# Patient Record
Sex: FEMALE | Race: WHITE | Employment: UNEMPLOYED | URBAN - METROPOLITAN AREA
[De-identification: names, ages, dates, MRNs, and addresses within clinical notes are randomized per-mention and may not be internally consistent; named-entity substitution may affect disease eponyms.]

---

## 2021-01-30 ENCOUNTER — IMMUNIZATION (OUTPATIENT)
Dept: PRIMARY CARE CLINIC | Age: 81
End: 2021-01-30
Payer: MEDICARE

## 2021-01-30 DIAGNOSIS — Z23 NEED FOR VACCINATION: Primary | ICD-10-CM

## 2021-01-30 PROCEDURE — 91300 COVID-19, PFIZER VACCINE 30MCG/0.3ML DOSE: CPT | Performed by: PHYSICIAN ASSISTANT

## 2021-01-30 PROCEDURE — 0001A COVID-19, PFIZER VACCINE 30MCG/0.3ML DOSE: CPT | Performed by: PHYSICIAN ASSISTANT

## 2021-02-22 ENCOUNTER — IMMUNIZATION (OUTPATIENT)
Dept: PRIMARY CARE CLINIC | Age: 81
End: 2021-02-22
Payer: MEDICARE

## 2021-02-22 PROCEDURE — 0002A COVID-19, PFIZER VACCINE 30MCG/0.3ML DOSE: CPT | Performed by: NURSE PRACTITIONER

## 2021-02-22 PROCEDURE — 91300 COVID-19, PFIZER VACCINE 30MCG/0.3ML DOSE: CPT | Performed by: NURSE PRACTITIONER

## 2022-12-07 ENCOUNTER — APPOINTMENT (OUTPATIENT)
Dept: GENERAL RADIOLOGY | Age: 82
End: 2022-12-07
Payer: MEDICARE

## 2022-12-07 ENCOUNTER — HOSPITAL ENCOUNTER (EMERGENCY)
Age: 82
Discharge: HOME | End: 2022-12-08
Attending: EMERGENCY MEDICINE
Payer: MEDICARE

## 2022-12-07 DIAGNOSIS — J96.91 RESPIRATORY FAILURE WITH HYPOXIA, UNSPECIFIED CHRONICITY (HCC): ICD-10-CM

## 2022-12-07 DIAGNOSIS — I35.0 AORTIC VALVE STENOSIS, ETIOLOGY OF CARDIAC VALVE DISEASE UNSPECIFIED: Primary | ICD-10-CM

## 2022-12-07 DIAGNOSIS — J18.9 PNEUMONIA DUE TO INFECTIOUS ORGANISM, UNSPECIFIED LATERALITY, UNSPECIFIED PART OF LUNG: ICD-10-CM

## 2022-12-07 LAB
ADENOVIRUS BY PCR: NOT DETECTED
ALBUMIN SERPL-MCNC: 4.4 G/DL (ref 3.5–5.2)
ALP BLD-CCNC: 86 U/L (ref 35–104)
ALT SERPL-CCNC: 9 U/L (ref 0–32)
ANION GAP SERPL CALCULATED.3IONS-SCNC: 20 MMOL/L (ref 7–16)
AST SERPL-CCNC: 14 U/L (ref 0–31)
B.E.: -13.9 MMOL/L (ref -3–3)
BACTERIA: ABNORMAL /HPF
BASOPHILS ABSOLUTE: 0.12 E9/L (ref 0–0.2)
BASOPHILS RELATIVE PERCENT: 0.7 % (ref 0–2)
BETA-HYDROXYBUTYRATE: 0.52 MMOL/L (ref 0.02–0.27)
BILIRUB SERPL-MCNC: 0.7 MG/DL (ref 0–1.2)
BILIRUBIN URINE: ABNORMAL
BLOOD, URINE: ABNORMAL
BORDETELLA PARAPERTUSSIS BY PCR: NOT DETECTED
BORDETELLA PERTUSSIS BY PCR: NOT DETECTED
BUN BLDV-MCNC: 24 MG/DL (ref 6–23)
CALCIUM SERPL-MCNC: 9.6 MG/DL (ref 8.6–10.2)
CHLAMYDOPHILIA PNEUMONIAE BY PCR: NOT DETECTED
CHLORIDE BLD-SCNC: 106 MMOL/L (ref 98–107)
CLARITY: CLEAR
CO2: 13 MMOL/L (ref 22–29)
COARSE CASTS, UA: ABNORMAL /LPF (ref 0–2)
COHB: 0.4 % (ref 0–1.5)
COLOR: YELLOW
COMMENT: ABNORMAL
CORONAVIRUS 229E BY PCR: NOT DETECTED
CORONAVIRUS HKU1 BY PCR: NOT DETECTED
CORONAVIRUS NL63 BY PCR: NOT DETECTED
CORONAVIRUS OC43 BY PCR: NOT DETECTED
CREAT SERPL-MCNC: 2.1 MG/DL (ref 0.5–1)
CRITICAL: ABNORMAL
DATE ANALYZED: ABNORMAL
DATE OF COLLECTION: ABNORMAL
EKG ATRIAL RATE: 87 BPM
EKG P AXIS: 6 DEGREES
EKG P-R INTERVAL: 192 MS
EKG Q-T INTERVAL: 360 MS
EKG QRS DURATION: 64 MS
EKG QTC CALCULATION (BAZETT): 430 MS
EKG R AXIS: 8 DEGREES
EKG T AXIS: -60 DEGREES
EKG VENTRICULAR RATE: 86 BPM
EOSINOPHILS ABSOLUTE: 0.04 E9/L (ref 0.05–0.5)
EOSINOPHILS RELATIVE PERCENT: 0.2 % (ref 0–6)
EPITHELIAL CELLS, UA: ABNORMAL /HPF
FIO2: 60 %
GFR SERPL CREATININE-BSD FRML MDRD: 23 ML/MIN/1.73
GLUCOSE BLD-MCNC: 343 MG/DL (ref 74–99)
GLUCOSE URINE: 250 MG/DL
HCO3: 12.2 MMOL/L (ref 22–26)
HCT VFR BLD CALC: 38.3 % (ref 34–48)
HEMOGLOBIN: 11.8 G/DL (ref 11.5–15.5)
HHB: 2 % (ref 0–5)
HUMAN METAPNEUMOVIRUS BY PCR: NOT DETECTED
HUMAN RHINOVIRUS/ENTEROVIRUS BY PCR: NOT DETECTED
IMMATURE GRANULOCYTES #: 0.11 E9/L
IMMATURE GRANULOCYTES %: 0.6 % (ref 0–5)
INFLUENZA A BY PCR: NOT DETECTED
INFLUENZA A BY PCR: NOT DETECTED
INFLUENZA B BY PCR: NOT DETECTED
INFLUENZA B BY PCR: NOT DETECTED
KETONES, URINE: ABNORMAL MG/DL
LAB: ABNORMAL
LACTIC ACID: 5.2 MMOL/L (ref 0.5–2.2)
LACTIC ACID: 6.7 MMOL/L (ref 0.5–2.2)
LEUKOCYTE ESTERASE, URINE: NEGATIVE
LYMPHOCYTES ABSOLUTE: 1.44 E9/L (ref 1.5–4)
LYMPHOCYTES RELATIVE PERCENT: 8.1 % (ref 20–42)
Lab: ABNORMAL
MCH RBC QN AUTO: 27.4 PG (ref 26–35)
MCHC RBC AUTO-ENTMCNC: 30.8 % (ref 32–34.5)
MCV RBC AUTO: 89.1 FL (ref 80–99.9)
METHB: 0.3 % (ref 0–1.5)
MODE: ABNORMAL
MONOCYTES ABSOLUTE: 1.12 E9/L (ref 0.1–0.95)
MONOCYTES RELATIVE PERCENT: 6.3 % (ref 2–12)
MYCOPLASMA PNEUMONIAE BY PCR: NOT DETECTED
NEUTROPHILS ABSOLUTE: 14.88 E9/L (ref 1.8–7.3)
NEUTROPHILS RELATIVE PERCENT: 84.1 % (ref 43–80)
NITRITE, URINE: NEGATIVE
O2 CONTENT: 16.7 ML/DL
O2 SATURATION: 98 % (ref 92–98.5)
O2HB: 97.3 % (ref 94–97)
OPERATOR ID: 5100
PARAINFLUENZA VIRUS 1 BY PCR: NOT DETECTED
PARAINFLUENZA VIRUS 2 BY PCR: NOT DETECTED
PARAINFLUENZA VIRUS 3 BY PCR: NOT DETECTED
PARAINFLUENZA VIRUS 4 BY PCR: NOT DETECTED
PATIENT TEMP: 37 C
PCO2: 29.3 MMHG (ref 35–45)
PDW BLD-RTO: 14.1 FL (ref 11.5–15)
PFO2: 2.01 MMHG/%
PH BLOOD GAS: 7.24 (ref 7.35–7.45)
PH UA: 5.5 (ref 5–9)
PLATELET # BLD: 233 E9/L (ref 130–450)
PMV BLD AUTO: 12.9 FL (ref 7–12)
PO2: 120.4 MMHG (ref 75–100)
POTASSIUM SERPL-SCNC: 4 MMOL/L (ref 3.5–5)
PRO-BNP: ABNORMAL PG/ML (ref 0–450)
PROCALCITONIN: 0.11 NG/ML (ref 0–0.08)
PROTEIN UA: >=300 MG/DL
RBC # BLD: 4.3 E12/L (ref 3.5–5.5)
RBC UA: ABNORMAL /HPF (ref 0–2)
RESPIRATORY SYNCYTIAL VIRUS BY PCR: NOT DETECTED
SARS-COV-2, NAAT: NOT DETECTED
SARS-COV-2, PCR: NOT DETECTED
SODIUM BLD-SCNC: 139 MMOL/L (ref 132–146)
SOURCE, BLOOD GAS: ABNORMAL
SPECIFIC GRAVITY UA: >=1.03 (ref 1–1.03)
THB: 12.1 G/DL (ref 11.5–16.5)
TIME ANALYZED: 859
TOTAL PROTEIN: 7.3 G/DL (ref 6.4–8.3)
TROPONIN, HIGH SENSITIVITY: 54 NG/L (ref 0–9)
UROBILINOGEN, URINE: 0.2 E.U./DL
WBC # BLD: 17.7 E9/L (ref 4.5–11.5)
WBC UA: ABNORMAL /HPF (ref 0–5)

## 2022-12-07 PROCEDURE — 84484 ASSAY OF TROPONIN QUANT: CPT

## 2022-12-07 PROCEDURE — 83880 ASSAY OF NATRIURETIC PEPTIDE: CPT

## 2022-12-07 PROCEDURE — 87040 BLOOD CULTURE FOR BACTERIA: CPT

## 2022-12-07 PROCEDURE — 6370000000 HC RX 637 (ALT 250 FOR IP)

## 2022-12-07 PROCEDURE — 87635 SARS-COV-2 COVID-19 AMP PRB: CPT

## 2022-12-07 PROCEDURE — 94660 CPAP INITIATION&MGMT: CPT

## 2022-12-07 PROCEDURE — 2580000003 HC RX 258

## 2022-12-07 PROCEDURE — 84145 PROCALCITONIN (PCT): CPT

## 2022-12-07 PROCEDURE — 82010 KETONE BODYS QUAN: CPT

## 2022-12-07 PROCEDURE — 94640 AIRWAY INHALATION TREATMENT: CPT

## 2022-12-07 PROCEDURE — 85025 COMPLETE CBC W/AUTO DIFF WBC: CPT

## 2022-12-07 PROCEDURE — 6360000002 HC RX W HCPCS

## 2022-12-07 PROCEDURE — 71045 X-RAY EXAM CHEST 1 VIEW: CPT

## 2022-12-07 PROCEDURE — 96374 THER/PROPH/DIAG INJ IV PUSH: CPT

## 2022-12-07 PROCEDURE — 96365 THER/PROPH/DIAG IV INF INIT: CPT

## 2022-12-07 PROCEDURE — 0202U NFCT DS 22 TRGT SARS-COV-2: CPT

## 2022-12-07 PROCEDURE — 93005 ELECTROCARDIOGRAM TRACING: CPT | Performed by: EMERGENCY MEDICINE

## 2022-12-07 PROCEDURE — 83605 ASSAY OF LACTIC ACID: CPT

## 2022-12-07 PROCEDURE — 80053 COMPREHEN METABOLIC PANEL: CPT

## 2022-12-07 PROCEDURE — 81001 URINALYSIS AUTO W/SCOPE: CPT

## 2022-12-07 PROCEDURE — 99285 EMERGENCY DEPT VISIT HI MDM: CPT

## 2022-12-07 PROCEDURE — 82805 BLOOD GASES W/O2 SATURATION: CPT

## 2022-12-07 PROCEDURE — 93010 ELECTROCARDIOGRAM REPORT: CPT | Performed by: INTERNAL MEDICINE

## 2022-12-07 PROCEDURE — 87502 INFLUENZA DNA AMP PROBE: CPT

## 2022-12-07 PROCEDURE — 2500000003 HC RX 250 WO HCPCS

## 2022-12-07 PROCEDURE — 36415 COLL VENOUS BLD VENIPUNCTURE: CPT

## 2022-12-07 PROCEDURE — 96375 TX/PRO/DX INJ NEW DRUG ADDON: CPT

## 2022-12-07 RX ORDER — IPRATROPIUM BROMIDE AND ALBUTEROL SULFATE 2.5; .5 MG/3ML; MG/3ML
3 SOLUTION RESPIRATORY (INHALATION) ONCE
Status: COMPLETED | OUTPATIENT
Start: 2022-12-07 | End: 2022-12-07

## 2022-12-07 RX ORDER — SODIUM CHLORIDE 9 MG/ML
INJECTION, SOLUTION INTRAVENOUS CONTINUOUS
Status: DISCONTINUED | OUTPATIENT
Start: 2022-12-07 | End: 2022-12-08 | Stop reason: HOSPADM

## 2022-12-07 RX ORDER — 0.9 % SODIUM CHLORIDE 0.9 %
500 INTRAVENOUS SOLUTION INTRAVENOUS ONCE
Status: COMPLETED | OUTPATIENT
Start: 2022-12-07 | End: 2022-12-07

## 2022-12-07 RX ADMIN — DOXYCYCLINE 100 MG: 100 INJECTION, POWDER, LYOPHILIZED, FOR SOLUTION INTRAVENOUS at 08:37

## 2022-12-07 RX ADMIN — SODIUM CHLORIDE 500 ML: 9 INJECTION, SOLUTION INTRAVENOUS at 09:08

## 2022-12-07 RX ADMIN — IPRATROPIUM BROMIDE AND ALBUTEROL SULFATE 3 AMPULE: 2.5; .5 SOLUTION RESPIRATORY (INHALATION) at 07:49

## 2022-12-07 RX ADMIN — CEFTRIAXONE 2000 MG: 2 INJECTION, POWDER, FOR SOLUTION INTRAMUSCULAR; INTRAVENOUS at 08:13

## 2022-12-07 RX ADMIN — SODIUM CHLORIDE: 9 INJECTION, SOLUTION INTRAVENOUS at 09:50

## 2022-12-07 ASSESSMENT — ENCOUNTER SYMPTOMS
ABDOMINAL PAIN: 0
COUGH: 1
TROUBLE SWALLOWING: 0
NAUSEA: 0
EYES NEGATIVE: 1
SORE THROAT: 0
EYE REDNESS: 0
DIARRHEA: 0
VOMITING: 0
SHORTNESS OF BREATH: 1

## 2022-12-07 ASSESSMENT — PAIN - FUNCTIONAL ASSESSMENT
PAIN_FUNCTIONAL_ASSESSMENT: NONE - DENIES PAIN
PAIN_FUNCTIONAL_ASSESSMENT: NONE - DENIES PAIN

## 2022-12-07 NOTE — ED PROVIDER NOTES
Dunia Childs is an 81year old female that presents to the emergency department for shortness of breath that started a few weeks ago. Complaint is constant, moderate in severity, and worsened on exertion. Patient reports that she was recently diagnosed with aortic valve stenosis and over the past several days her shortness of breath has significantly worsened and has now noticed some lower extremity edema. On arrival, patient was 89% on room air and was placed on 3 L NC O2 with improvement. She denies any fever, chills, chest pain, abdominal pain, nausea, vomiting, diarrhea. The history is provided by the patient. Review of Systems   Constitutional:  Negative for chills and fever. HENT: Negative. Negative for congestion, sore throat and trouble swallowing. Eyes: Negative. Negative for redness. Respiratory:  Positive for cough and shortness of breath. Cardiovascular:  Positive for leg swelling. Negative for chest pain. Gastrointestinal:  Negative for abdominal pain, diarrhea, nausea and vomiting. Genitourinary: Negative. Negative for dysuria and hematuria. Musculoskeletal:  Negative for neck pain and neck stiffness. Skin: Negative. Neurological:  Negative for dizziness, light-headedness and headaches. Psychiatric/Behavioral: Negative. Negative for agitation and behavioral problems. Physical Exam  Vitals and nursing note reviewed. Constitutional:       Appearance: Normal appearance. She is well-developed. HENT:      Head: Normocephalic and atraumatic. Eyes:      Pupils: Pupils are equal, round, and reactive to light. Cardiovascular:      Rate and Rhythm: Normal rate and regular rhythm. Pulmonary:      Effort: Pulmonary effort is normal.      Breath sounds: Wheezing and rhonchi present. No rales. Comments: Diminished breath sounds bilaterally with expiratory wheeze  Abdominal:      Palpations: Abdomen is soft. Tenderness:  There is no abdominal tenderness. There is no guarding or rebound. Musculoskeletal:      Cervical back: Normal range of motion. No rigidity. Right lower leg: Edema present. Left lower leg: Edema present. Comments: 2+ lower extremity edema bilaterally   Skin:     General: Skin is warm and dry. Capillary Refill: Capillary refill takes less than 2 seconds. Neurological:      General: No focal deficit present. Mental Status: She is alert and oriented to person, place, and time. Psychiatric:         Mood and Affect: Mood normal.         Behavior: Behavior normal.        Procedures     MDM  Number of Diagnoses or Management Options  Aortic valve stenosis, etiology of cardiac valve disease unspecified  Pneumonia due to infectious organism, unspecified laterality, unspecified part of lung  Respiratory failure with hypoxia, unspecified chronicity ([facility] [address]  Diagnosis management comments: Patient presented the emergency department for worsening shortness of breath over the past few days with a history of CHF and new diagnosis of aortic valve stenosis. On auscultation, patient had diminished breath sounds bilaterally with expiratory wheezes. She was 89% on room air when she arrived, patient was placed on BiPAP with improvement. Work-up shows evidence of bilateral pneumonia, leukocytosis of 17,000, and a BNP of 31,000. Patient was started on antibiotics for pneumonia coverage and was administered 500 cc IV fluid with 100 cc/h after that with the patient being dehydrated-lactic acid was over 6 on arrival.  On repeat lactic acid, numbers improved. The patient several hours later was able to come off BiPAP and is breathing comfortably with family at bedside. They wish to be transferred to the [facility] to have cardiology evaluate them with the history of aortic valve stenosis and today's comorbidities. Was concern for possible diabetic ketoacidosis, beta hydroxybutyrate is 0.52, will continue maintenance fluids. Discussed case with Dr. Missy Mejia ([facility] cardiology), accepts the patient for transfer. --------------------------------------------- PAST HISTORY ---------------------------------------------  Past Medical History:  has a past medical history of CAD (coronary artery disease) and Diabetes mellitus ([facility] [address] Past Surgical History:  has a past surgical history that includes Cardiac surgery; fracture surgery; Colonoscopy; and Endoscopy, colon, diagnostic. Social History:  reports that she has never smoked. She does not have any smokeless tobacco history on file. She reports that she does not drink alcohol and does not use drugs. Family History: family history is not on file. The patients home medications have been reviewed. Allergies: Patient has no known allergies.     -------------------------------------------------- RESULTS -------------------------------------------------    Lab  Results for orders placed or performed during the hospital encounter of 12/07/22   COVID-19, Rapid    Specimen: Nasopharyngeal Swab   Result Value Ref Range    SARS-CoV-2, NAAT Not Detected Not Detected   Rapid influenza A/B antigens    Specimen: Nares   Result Value Ref Range    Influenza A by PCR Not Detected Not Detected    Influenza B by PCR Not Detected Not Detected   CBC with Auto Differential   Result Value Ref Range    WBC 17.7 (H) 4.5 - 11.5 E9/L    RBC 4.30 3.50 - 5.50 E12/L    Hemoglobin 11.8 11.5 - 15.5 g/dL    Hematocrit 38.3 34.0 - 48.0 %    MCV 89.1 80.0 - 99.9 fL    MCH 27.4 26.0 - 35.0 pg    MCHC 30.8 (L) 32.0 - 34.5 %    RDW 14.1 11.5 - 15.0 fL    Platelets 184 688 - 803 E9/L    MPV 12.9 (H) 7.0 - 12.0 fL    Neutrophils % 84.1 (H) 43.0 - 80.0 %    Immature Granulocytes % 0.6 0.0 - 5.0 %    Lymphocytes % 8.1 (L) 20.0 - 42.0 %    Monocytes % 6.3 2.0 - 12.0 %    Eosinophils % 0.2 0.0 - 6.0 %    Basophils % 0.7 0.0 - 2.0 %    Neutrophils Absolute 14.88 (H) 1.80 - 7.30 E9/L    Immature Granulocytes # 0.11 E9/L Lymphocytes Absolute 1.44 (L) 1.50 - 4.00 E9/L    Monocytes Absolute 1.12 (H) 0.10 - 0.95 E9/L    Eosinophils Absolute 0.04 (L) 0.05 - 0.50 E9/L    Basophils Absolute 0.12 0.00 - 0.20 E9/L   Comprehensive Metabolic Panel   Result Value Ref Range    Sodium 139 132 - 146 mmol/L    Potassium 4.0 3.5 - 5.0 mmol/L    Chloride 106 98 - 107 mmol/L    CO2 13 (L) 22 - 29 mmol/L    Anion Gap 20 (H) 7 - 16 mmol/L    Glucose 343 (H) 74 - 99 mg/dL    BUN 24 (H) 6 - 23 mg/dL    Creatinine 2.1 (H) 0.5 - 1.0 mg/dL    Est, Glom Filt Rate 23 >=60 mL/min/1.73    Calcium 9.6 8.6 - 10.2 mg/dL    Total Protein 7.3 6.4 - 8.3 g/dL    Albumin 4.4 3.5 - 5.2 g/dL    Total Bilirubin 0.7 0.0 - 1.2 mg/dL    Alkaline Phosphatase 86 35 - 104 U/L    ALT 9 0 - 32 U/L    AST 14 0 - 31 U/L   Troponin   Result Value Ref Range    Troponin, High Sensitivity 54 (H) 0 - 9 ng/L   Brain Natriuretic Peptide   Result Value Ref Range    Pro-BNP 31,897 (H) 0 - 450 pg/mL   Lactic Acid   Result Value Ref Range    Lactic Acid 6.7 (HH) 0.5 - 2.2 mmol/L   Beta-Hydroxybutyrate   Result Value Ref Range    Beta-Hydroxybutyrate 0.52 (H) 0.02 - 0.27 mmol/L   Blood Gas, Arterial   Result Value Ref Range    Date Analyzed 20221207     Time Analyzed 0859     Source: Blood Arterial     pH, Blood Gas 7.236 (L) 7.350 - 7.450    PCO2 29.3 (L) 35.0 - 45.0 mmHg    PO2 120.4 (H) 75.0 - 100.0 mmHg    HCO3 12.2 (L) 22.0 - 26.0 mmol/L    B.E. -13.9 (L) -3.0 - 3.0 mmol/L    O2 Sat 98.0 92.0 - 98.5 %    PO2/FIO2 2.01 mmHg/%    O2Hb 97.3 (H) 94.0 - 97.0 %    COHb 0.4 0.0 - 1.5 %    MetHb 0.3 0.0 - 1.5 %    O2 Content 16.7 mL/dL    HHb 2.0 0.0 - 5.0 %    tHb (est) 12.1 11.5 - 16.5 g/dL    Mode NIV PAP     FIO2 60.0 %    Comment Bipap 10/5     Date Of Collection      Time Collected      Pt Temp 37.0 C     ID [id number]     Lab [id number]     Critical(s) Notified .  No Critical Values    Lactic Acid   Result Value Ref Range    Lactic Acid 5.2 (HH) 0.5 - 2.2 mmol/L   Procalcitonin   Result Value Ref Range    Procalcitonin 0.11 (H) 0.00 - 0.08 ng/mL   EKG 12 Lead   Result Value Ref Range    Ventricular Rate 86 BPM    Atrial Rate 87 BPM    P-R Interval 192 ms    QRS Duration 64 ms    Q-T Interval 360 ms    QTc Calculation (Bazett) 430 ms    P Axis 6 degrees    R Axis 8 degrees    T Axis -60 degrees       Radiology  XR CHEST PORTABLE    Result Date: 12/7/2022  EXAMINATION: ONE XRAY VIEW OF THE CHEST 12/7/2022 7:40 am COMPARISON: None. HISTORY: ORDERING SYSTEM PROVIDED HISTORY: Shortness of breath TECHNOLOGIST PROVIDED HISTORY: Reason for exam:->Shortness of breath What reading provider will be dictating this exam?->CRC FINDINGS: The cardiac silhouette is enlarged. Postop sternotomy changes are noted Extensive multifocal bilateral airspace disease is noted. There is a small left pleural effusion. 1. Extensive multifocal bilateral airspace disease 2. Small left pleural effusion           ------------------------- NURSING NOTES AND VITALS REVIEWED ---------------------------  Date / Time Roomed:  12/7/2022  6:40 AM  ED Bed Assignment:  22/22    The nursing notes within the ED encounter and vital signs as below have been reviewed.    Patient Vitals for the past 24 hrs:   BP Temp Temp src Pulse Resp SpO2 Height Weight   12/07/22 1307 (!) 120/57 -- -- 76 21 97 % -- --   12/07/22 1204 -- -- -- 77 23 96 % -- --   12/07/22 1148 (!) 113/46 -- -- 76 23 100 % -- --   12/07/22 0947 (!) 105/53 -- -- 87 28 99 % -- --   12/07/22 0736 115/88 -- -- 88 (!) 33 92 % -- --   12/07/22 0717 -- -- -- -- (!) 33 -- -- --   12/07/22 0704 (!) 132/91 -- -- 85 29 93 % -- --   12/07/22 0645 -- -- -- -- -- -- 5' 3\" (1.6 m) 175 lb (79.4 kg)   12/07/22 0637 133/84 97.2 °F (36.2 °C) Oral 86 24 92 % -- --       Oxygen Saturation Interpretation: Normal      ------------------------------------------ PROGRESS NOTES ------------------------------------------      I have spoken with the patient and discussed todays results, in addition to providing specific details for the plan of care and counseling regarding the diagnosis and prognosis. Their questions are answered at this time and they are agreeable with the plan.      --------------------------------- ADDITIONAL PROVIDER NOTES ---------------------------------  Consultations:  Spoke with Dr. Shadi Smith (Cardiology for [facility]),  They accept the patient for transfer. This patient's ED course included: a personal history and physicial examination, multiple bedside re-evaluations, IV medications, cardiac monitoring, and continuous pulse oximetry    This patient has remained unchanged during their ED course. Clinical Impression  1. Aortic valve stenosis, etiology of cardiac valve disease unspecified    2. Respiratory failure with hypoxia, unspecified chronicity ([address]    3. Pneumonia due to infectious organism, unspecified laterality, unspecified part of lung          Disposition  Patient's disposition: Transfer to [facility]  Patient's condition is serious. ED Course as of 12/07/22 1652   Wed Dec 07, 2022   9796 Breathing improved with BiPAP in the emergency department. Aware of the patient's lactic acidosis, will administer IV fluids and collect blood cultures. [DT]   1130 Reevaluation-patient reports her breathing is much improved and is resting comfortably, still on BiPAP. Still waiting for beta hydroxybutyrate to be resulted. Ordered urine and repeat lactic acid. Will admit the patient after results. Note-she states she has never been in diabetic ketoacidosis before or required any hospitalization for diabetes.  [DT]      ED Course User Index  [DT] Nargis Du,   Resident  12/07/22 [id number]

## 2022-12-07 NOTE — PROGRESS NOTES
Date: 12/7/2022    Time: 7:32 AM    Patient Placed On BIPAP/CPAP/ Non-Invasive Ventilation? Yes    If no must comment. Facial area red/color change? No           If YES are Blister/Lesion present? No   If yes must notify nursing staff  BIPAP/CPAP skin barrier?   Yes    Skin barrier type:mepilexlite       Comments:        Collins Sánchez RCP

## 2022-12-07 NOTE — PROGRESS NOTES
12/07/22 1204   Oxygen Therapy/Pulse Ox   O2 Device Nasal cannula   O2 Flow Rate (L/min) (S)  5 L/min   Heart Rate 77   Resp 23   SpO2 96 %   Pulse Oximeter Device Mode Continuous   Pulse Oximeter Device Location Finger       Per patient, her breathing is better at this time. Dr. Rei Perry said patient can try to come off the BIPAP. Patient placed on a NC 5 LPM. SPO2 96%.

## 2022-12-08 VITALS
HEART RATE: 74 BPM | BODY MASS INDEX: 31.01 KG/M2 | SYSTOLIC BLOOD PRESSURE: 144 MMHG | RESPIRATION RATE: 16 BRPM | OXYGEN SATURATION: 98 % | HEIGHT: 63 IN | TEMPERATURE: 97.2 F | WEIGHT: 175 LBS | DIASTOLIC BLOOD PRESSURE: 70 MMHG

## 2022-12-08 LAB
BLOOD CULTURE, ROUTINE: NORMAL
CULTURE, BLOOD 2: NORMAL

## 2022-12-08 PROCEDURE — 94660 CPAP INITIATION&MGMT: CPT

## 2022-12-08 NOTE — ED NOTES
Updated patient on transportation situation to [facility] clinic. At this time patient breathing seemed slightly labored. This RN placed the patient back on the cpap machine using previous settings.  Patient resting comfortably in bed     Yari Camejo RN  12/07/22 [id number]

## 2022-12-08 NOTE — CARE COORDINATION
Social Work/ Transition of Care:    Pt presents to the ED secondary to shortness of breath. Pt is from home. Pt's pulse ox was 89% on room air upon arrival to ED. Pt  was placed on 3L oxygen at that time. Per ED physician, pt will be transferred to [facility] for evaluation by cardiology. Transport arranged through access center and [facility] ambulance accepted the trip. SW spoke to pt,  Veronica Ortizo), and daughter Annia ), concerning possible private pay cost for ambulance transport to [facility]. Pt's  given private pay cost from [employer] member in ED. Pt's  verbalized understanding.

## 2022-12-08 NOTE — ED NOTES
Hawk Horton from access center call, pt would have to pay [facility] to take her to [facility]. Asked if they could call other ambulances that would be covered by her insurance.       Yazmin Bird  12/08/22 [id number]

## 2022-12-12 LAB
BLOOD CULTURE, ROUTINE: NORMAL
CULTURE, BLOOD 2: NORMAL

## 2023-03-15 ENCOUNTER — HOSPITAL ENCOUNTER (EMERGENCY)
Age: 83
Discharge: HOME | End: 2023-03-15
Attending: EMERGENCY MEDICINE | Admitting: FAMILY MEDICINE
Payer: MEDICARE

## 2023-03-15 ENCOUNTER — APPOINTMENT (OUTPATIENT)
Dept: GENERAL RADIOLOGY | Age: 83
End: 2023-03-15
Payer: MEDICARE

## 2023-03-15 VITALS
RESPIRATION RATE: 14 BRPM | BODY MASS INDEX: 25.69 KG/M2 | OXYGEN SATURATION: 96 % | DIASTOLIC BLOOD PRESSURE: 49 MMHG | HEART RATE: 61 BPM | TEMPERATURE: 97 F | SYSTOLIC BLOOD PRESSURE: 139 MMHG | WEIGHT: 145 LBS

## 2023-03-15 DIAGNOSIS — R00.1 BRADYCARDIA: Primary | ICD-10-CM

## 2023-03-15 DIAGNOSIS — E87.5 HYPERKALEMIA: ICD-10-CM

## 2023-03-15 LAB
ALBUMIN SERPL-MCNC: 3.5 G/DL (ref 3.5–5.2)
ALP SERPL-CCNC: 66 U/L (ref 35–104)
ALT SERPL-CCNC: 18 U/L (ref 0–32)
ANION GAP SERPL CALCULATED.3IONS-SCNC: 12 MMOL/L (ref 7–16)
AST SERPL-CCNC: 58 U/L (ref 0–31)
BACTERIA URNS QL MICRO: ABNORMAL /HPF
BASOPHILS # BLD: 0.04 E9/L (ref 0–0.2)
BASOPHILS NFR BLD: 0.7 % (ref 0–2)
BILIRUB SERPL-MCNC: 0.3 MG/DL (ref 0–1.2)
BILIRUB UR QL STRIP: NEGATIVE
BNP BLD-MCNC: 7171 PG/ML (ref 0–450)
BUN SERPL-MCNC: 27 MG/DL (ref 6–23)
CALCIUM SERPL-MCNC: 8.6 MG/DL (ref 8.6–10.2)
CHLORIDE SERPL-SCNC: 103 MMOL/L (ref 98–107)
CLARITY UR: CLEAR
CO2 SERPL-SCNC: 20 MMOL/L (ref 22–29)
COLOR UR: YELLOW
CREAT SERPL-MCNC: 3.5 MG/DL (ref 0.5–1)
EKG ATRIAL RATE: 156 BPM
EKG Q-T INTERVAL: 500 MS
EKG QRS DURATION: 92 MS
EKG QTC CALCULATION (BAZETT): 460 MS
EKG R AXIS: -39 DEGREES
EKG T AXIS: 43 DEGREES
EKG VENTRICULAR RATE: 51 BPM
EOSINOPHIL # BLD: 0.15 E9/L (ref 0.05–0.5)
EOSINOPHIL NFR BLD: 2.8 % (ref 0–6)
ERYTHROCYTE [DISTWIDTH] IN BLOOD BY AUTOMATED COUNT: 15.3 FL (ref 11.5–15)
GLUCOSE BLD-MCNC: 100 MG/DL (ref 74–99)
GLUCOSE SERPL-MCNC: 101 MG/DL (ref 74–99)
GLUCOSE UR STRIP-MCNC: NEGATIVE MG/DL
HCT VFR BLD AUTO: 32.3 % (ref 34–48)
HGB BLD-MCNC: 9.7 G/DL (ref 11.5–15.5)
HGB UR QL STRIP: ABNORMAL
IMM GRANULOCYTES # BLD: 0.01 E9/L
IMM GRANULOCYTES NFR BLD: 0.2 % (ref 0–5)
KETONES UR STRIP-MCNC: NEGATIVE MG/DL
LEUKOCYTE ESTERASE UR QL STRIP: ABNORMAL
LYMPHOCYTES # BLD: 1.3 E9/L (ref 1.5–4)
LYMPHOCYTES NFR BLD: 24.3 % (ref 20–42)
MAGNESIUM SERPL-MCNC: 2.4 MG/DL (ref 1.6–2.6)
MCH RBC QN AUTO: 29.9 PG (ref 26–35)
MCHC RBC AUTO-ENTMCNC: 30 % (ref 32–34.5)
MCV RBC AUTO: 99.7 FL (ref 80–99.9)
MONOCYTES # BLD: 0.5 E9/L (ref 0.1–0.95)
MONOCYTES NFR BLD: 9.3 % (ref 2–12)
NEUTROPHILS # BLD: 3.36 E9/L (ref 1.8–7.3)
NEUTS SEG NFR BLD: 62.7 % (ref 43–80)
NITRITE UR QL STRIP: NEGATIVE
PERFORMED ON: ABNORMAL
PH UR STRIP: 6 [PH] (ref 5–9)
PLATELET # BLD AUTO: 77 E9/L (ref 130–450)
PLATELET CONFIRMATION: NORMAL
PMV BLD AUTO: 11.8 FL (ref 7–12)
POC CHLORIDE: 108 MMOL/L (ref 100–108)
POC CREATININE: 3.5 MG/DL (ref 0.5–1)
POC SODIUM: 137 MMOL/L (ref 132–146)
POTASSIUM SERPL-SCNC: 5.2 MMOL/L (ref 3.5–5)
POTASSIUM SERPL-SCNC: 5.8 MMOL/L (ref 3.5–5)
PROT SERPL-MCNC: 6.1 G/DL (ref 6.4–8.3)
PROT UR STRIP-MCNC: NEGATIVE MG/DL
RBC # BLD AUTO: 3.24 E12/L (ref 3.5–5.5)
RBC #/AREA URNS HPF: ABNORMAL /HPF (ref 0–2)
SODIUM SERPL-SCNC: 135 MMOL/L (ref 132–146)
SP GR UR STRIP: 1.01 (ref 1–1.03)
T4 SERPL-MCNC: 6.6 MCG/DL (ref 4.5–11.7)
TROPONIN, HIGH SENSITIVITY: 113 NG/L (ref 0–9)
TSH SERPL-MCNC: 6.14 UIU/ML (ref 0.27–4.2)
UROBILINOGEN UR STRIP-ACNC: 0.2 E.U./DL
WBC # BLD: 5.4 E9/L (ref 4.5–11.5)
WBC #/AREA URNS HPF: ABNORMAL /HPF (ref 0–5)

## 2023-03-15 PROCEDURE — 82435 ASSAY OF BLOOD CHLORIDE: CPT

## 2023-03-15 PROCEDURE — 93005 ELECTROCARDIOGRAM TRACING: CPT | Performed by: EMERGENCY MEDICINE

## 2023-03-15 PROCEDURE — 96375 TX/PRO/DX INJ NEW DRUG ADDON: CPT

## 2023-03-15 PROCEDURE — 84295 ASSAY OF SERUM SODIUM: CPT

## 2023-03-15 PROCEDURE — 83880 ASSAY OF NATRIURETIC PEPTIDE: CPT

## 2023-03-15 PROCEDURE — 96365 THER/PROPH/DIAG IV INF INIT: CPT

## 2023-03-15 PROCEDURE — 2580000003 HC RX 258: Performed by: EMERGENCY MEDICINE

## 2023-03-15 PROCEDURE — 82565 ASSAY OF CREATININE: CPT

## 2023-03-15 PROCEDURE — 80053 COMPREHEN METABOLIC PANEL: CPT

## 2023-03-15 PROCEDURE — 96361 HYDRATE IV INFUSION ADD-ON: CPT

## 2023-03-15 PROCEDURE — 84436 ASSAY OF TOTAL THYROXINE: CPT

## 2023-03-15 PROCEDURE — 6360000002 HC RX W HCPCS: Performed by: EMERGENCY MEDICINE

## 2023-03-15 PROCEDURE — 1200000000 HC SEMI PRIVATE

## 2023-03-15 PROCEDURE — 83735 ASSAY OF MAGNESIUM: CPT

## 2023-03-15 PROCEDURE — 84484 ASSAY OF TROPONIN QUANT: CPT

## 2023-03-15 PROCEDURE — 87077 CULTURE AEROBIC IDENTIFY: CPT

## 2023-03-15 PROCEDURE — 84443 ASSAY THYROID STIM HORMONE: CPT

## 2023-03-15 PROCEDURE — 84132 ASSAY OF SERUM POTASSIUM: CPT

## 2023-03-15 PROCEDURE — 85025 COMPLETE CBC W/AUTO DIFF WBC: CPT

## 2023-03-15 PROCEDURE — 6370000000 HC RX 637 (ALT 250 FOR IP): Performed by: EMERGENCY MEDICINE

## 2023-03-15 PROCEDURE — 93010 ELECTROCARDIOGRAM REPORT: CPT | Performed by: INTERNAL MEDICINE

## 2023-03-15 PROCEDURE — 99285 EMERGENCY DEPT VISIT HI MDM: CPT

## 2023-03-15 PROCEDURE — 87186 SC STD MICRODIL/AGAR DIL: CPT

## 2023-03-15 PROCEDURE — 82947 ASSAY GLUCOSE BLOOD QUANT: CPT

## 2023-03-15 PROCEDURE — 87088 URINE BACTERIA CULTURE: CPT

## 2023-03-15 PROCEDURE — 36415 COLL VENOUS BLD VENIPUNCTURE: CPT

## 2023-03-15 PROCEDURE — 71045 X-RAY EXAM CHEST 1 VIEW: CPT

## 2023-03-15 PROCEDURE — 81001 URINALYSIS AUTO W/SCOPE: CPT

## 2023-03-15 PROCEDURE — 2500000003 HC RX 250 WO HCPCS: Performed by: EMERGENCY MEDICINE

## 2023-03-15 RX ORDER — CALCIUM GLUCONATE 10 MG/ML
1000 INJECTION, SOLUTION INTRAVENOUS ONCE
Status: COMPLETED | OUTPATIENT
Start: 2023-03-15 | End: 2023-03-15

## 2023-03-15 RX ORDER — DEXTROSE MONOHYDRATE 25 G/50ML
25 INJECTION, SOLUTION INTRAVENOUS ONCE
Status: COMPLETED | OUTPATIENT
Start: 2023-03-15 | End: 2023-03-15

## 2023-03-15 RX ORDER — 0.9 % SODIUM CHLORIDE 0.9 %
500 INTRAVENOUS SOLUTION INTRAVENOUS ONCE
Status: COMPLETED | OUTPATIENT
Start: 2023-03-15 | End: 2023-03-15

## 2023-03-15 RX ADMIN — CALCIUM GLUCONATE 1000 MG: 10 INJECTION, SOLUTION INTRAVENOUS at 16:44

## 2023-03-15 RX ADMIN — SODIUM CHLORIDE 500 ML: 9 INJECTION, SOLUTION INTRAVENOUS at 15:42

## 2023-03-15 RX ADMIN — INSULIN HUMAN 5 UNITS: 100 INJECTION, SOLUTION PARENTERAL at 15:42

## 2023-03-15 RX ADMIN — DEXTROSE MONOHYDRATE 25 G: 25 INJECTION, SOLUTION INTRAVENOUS at 15:42

## 2023-03-15 ASSESSMENT — PAIN - FUNCTIONAL ASSESSMENT: PAIN_FUNCTIONAL_ASSESSMENT: NONE - DENIES PAIN

## 2023-03-15 NOTE — ED PROVIDER NOTES
[address] [facility] ENCOUNTER        Pt Name: Peter Flores  MRN: [id number]  [place] 1940  Date of evaluation: 3/15/2023  Provider: Capo Rodriguez DO  PCP: Roc Chamorro MD  Note Started: 12:58 PM EDT 3/15/23    CHIEF COMPLAINT       Chief Complaint   Patient presents with    Bradycardia     30-50 at dialysis today, pt has no complaints        HISTORY OF PRESENT ILLNESS: 1 or more Elements   History From: Patient and EMS    Limitations to history : None    Peter Flores is a 81 y.o. female who presents with concern for bradycardia. She was at dialysis today and her heart rate got into the 30s, they sent her here for further evaluation. She was not having lightheadedness or dizziness, no chest pain or shortness of breath, no pain or swelling in her legs. Her last full dialysis was on Monday. She says she is supposed to get it 3 times a week. Has not been having fevers or chills, she completed her antibiotics from the [facility] clinic. No other acute complaints. Nursing Notes were all reviewed and agreed with or any disagreements were addressed in the HPI. REVIEW OF SYSTEMS :      Positives and Pertinent negatives as per HPI.      SURGICAL HISTORY     Past Surgical History:   Procedure Laterality Date    CARDIAC SURGERY      aortic valve replacement 2015- f/u w/ Dr. Idalmis Harvey and PCP    COLONOSCOPY      ENDOSCOPY, COLON, DIAGNOSTIC      FRACTURE SURGERY      screws placed, r ankle        CURRENTMEDICATIONS       Discharge Medication List as of 3/15/2023  5:40 PM        CONTINUE these medications which have NOT CHANGED    Details   linagliptin (TRADJENTA) 5 MG tablet Take 5 mg by mouth daily      bisoprolol-hydrochlorothiazide (ZIAC) 10-6.25 MG per tablet Take 1 tablet by mouth daily      ramipril (ALTACE) 10 MG capsule Take 10 mg by mouth 2 times daily Instructed to take am of procedure      amLODIPine (NORVASC) 10 MG tablet Take 10 mg by mouth daily Instructed to take am of procedure             ALLERGIES     Patient has no known allergies. FAMILYHISTORY     History reviewed. No pertinent family history. SOCIAL HISTORY       Social History     Tobacco Use    Smoking status: Never   Substance Use Topics    Alcohol use: No    Drug use: No       SCREENINGS        Jose Coma Scale  Eye Opening: Spontaneous  Best Verbal Response: Oriented  Best Motor Response: Obeys commands  [place] Coma Scale Score: 15                CIWA Assessment  BP: (!) 139/49  Heart Rate: 61           PHYSICAL EXAM  1 or more Elements     ED Triage Vitals   BP Temp Temp src Pulse Resp SpO2 Height Weight   -- -- -- -- -- -- -- --       Constitutional/General: Alert and oriented x3  Head: Normocephalic and atraumatic  Eyes: PERRL, EOMI, conjunctiva normal, sclera non icteric  ENT:  Oropharynx clear, handling secretions  Neck: Supple, full ROM, no stridor  Respiratory: Lungs clear to auscultation bilaterally, no wheezes, rales, or rhonchi. Not in respiratory distress  Cardiovascular: Bradycardic rate. Regular rhythm. 2+ distal pulses. Equal extremity pulses. Chest: No chest wall tenderness  GI:  Abdomen Soft, Non tender, Non distended. No rebound, guarding, or rigidity. Musculoskeletal: Moves all extremities x 4. Warm and well perfused, no cyanosis, no edema. Capillary refill <3 seconds  Integument: skin warm and dry. No rashes.   Healing incision right distal femur  Neurologic: GCS 15, no focal deficits, symmetric strength 5/5 in the upper and lower extremities bilaterally  Psychiatric: Normal Affect      DIAGNOSTIC RESULTS   LABS:    Labs Reviewed   CBC WITH AUTO DIFFERENTIAL - Abnormal; Notable for the following components:       Result Value    RBC 3.24 (*)     Hemoglobin 9.7 (*)     Hematocrit 32.3 (*)     MCHC 30.0 (*)     RDW 15.3 (*)     Platelets 77 (*)     Lymphocytes Absolute 1.30 (*)     All other components within normal limits COMPREHENSIVE METABOLIC PANEL - Abnormal; Notable for the following components:    Potassium 5.8 (*)     CO2 20 (*)     Glucose 101 (*)     BUN 27 (*)     Creatinine 3.5 (*)     Total Protein 6.1 (*)     AST 58 (*)     All other components within normal limits   TSH - Abnormal; Notable for the following components:    TSH 6.140 (*)     All other components within normal limits   URINALYSIS WITH MICROSCOPIC - Abnormal; Notable for the following components:    Leukocyte Esterase, Urine SMALL (*)     Bacteria, UA FEW (*)     All other components within normal limits   BRAIN NATRIURETIC PEPTIDE - Abnormal; Notable for the following components:    Pro-BNP 7,171 (*)     All other components within normal limits    Narrative:     CALL  Reed  H34 tel. ,  Chemistry results called to and read back by: , 03/15/2023 14:53, by  ASIA   TROPONIN - Abnormal; Notable for the following components:    Troponin, High Sensitivity 113 (*)     All other components within normal limits   POCT VENOUS - Abnormal; Notable for the following components:    POC Potassium 5.2 (*)     POC Glucose 100 (*)     POC Creatinine 3.5 (*)     All other components within normal limits   CULTURE, URINE   MAGNESIUM   PLATELET CONFIRMATION    Narrative:     CALL  Reed  H34 tel. ,  Chemistry results called to and read back by: , 03/15/2023 14:53, by  ASIA   T4   BASIC METABOLIC PANEL       As interpreted by me, the above displayed labs are abnormal. All other labs obtained during this visit were within normal range or not returned as of this dictation.     RADIOLOGY:   Non-plain film images such as CT, Ultrasound and MRI are read by the radiologist. Plain radiographic images are visualized and preliminarily interpreted by the ED Provider with the below findings:    Interpretation per the Radiologist below, if available at the time of this note:    XR CHEST PORTABLE   Final Result   New left-sided central venous catheter terminating in the right atrium   proper. Consider repositioning this more superiorly roughly 4 cm. No   pneumothorax. Resolving airspace disease. No results found. No results found. PROCEDURES   Unless otherwise noted below, none    PAST MEDICAL HISTORY/Chronic Conditions Affecting Care      has a past medical history of CAD (coronary artery disease) and Diabetes mellitus ([facility] [address] EMERGENCY DEPARTMENT COURSE    Vitals:    Vitals:    03/15/23 1303 03/15/23 1808   BP: (!) 152/45 (!) 139/49   Pulse: 51 61   Resp: 18 14   Temp: 97 °F (36.1 °C)    SpO2: 96% 96%   Weight: 145 lb (65.8 kg)        Patient was given the following medications:  Medications   0.9 % sodium chloride bolus (0 mLs IntraVENous Stopped 3/15/23 1731)   insulin regular (HUMULIN R;NOVOLIN R) injection 5 Units (5 Units IntraVENous Given 3/15/23 1542)   dextrose 50 % IV solution (25 g IntraVENous Given 3/15/23 1542)   calcium gluconate 1000 mg in sodium chloride 100 mL (0 mg IntraVENous Stopped 3/15/23 1731)       Medical Decision Making/Differential Diagnosis:  CC/HPI Summary, Social Determinants of health, Records Reviewed, DDx, testing done/not done, ED Course, Reassessment, disposition considerations/shared decision making with patient, consults, disposition:        CC/HPI Summary, DDx, ED Course, Reassessment, Tests Considered, Patient expectation:   81-year-old female with past medical history of hypertension, hyperlipidemia, diabetes, CKD, atrial fibrillation, age, mitral, tricuspid valve replacement on Eliquis presenting today with bradycardia. He was in the 30s at dialysis and she was sent here for further evaluation. She is asymptomatic. Blood pressure is entirely stable on arrival to emergency department. Nothing made it better or worse, she says that she did not take any extra of her metoprolol prior to arrival.  Has not been having fevers or chills.   Physical exam demonstrating alert and appropriately oriented female in no acute distress and nontoxic-appearing. EKG and telemetry demonstrating sinus bradycardia. Differential diagnosis to include but not limited to electrolyte abnormality, ACS, sepsis. EKG with no acute ST elevations, Valeriano lead obtained and multiple telemetry strips when rhythm changed and patient was always in sinus bradycardia. Labs demonstrate elevated BNP, hyperkalemia at 5.8 that was treated with IV calcium gluconate, D50 and insulin. No leukocytosis or anemia. Kidney function chronically elevated. Chest x-ray without acute cardiopulmonary pathology. Plan had been for admission for bradycardia and further cardiac evaluation with dialysis planned for hyperkalemia potentially causing this bradycardia. Patient's family doctor came to evaluate patient at bedside and will coordinate outpatient management. With shared decision-making, the family and the patient and her PCP are agreeable to this plan. Given very strict return precautions. ED Course as of 03/15/23 2139   Wed Mar 15, 2023   1257 Last echo completed on 2/28/2023 at the [facility] clinic that demonstrated LVEF at 63± percent. [MM]   9427 On chart review, she was recently admitted to the [facility] clinic for redo of aortic valve replacement and mitral valve replacement and tricuspid valve replacement. 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 She developed kidney injury while in the hospital and began to require dialysis and a tunneled left chest hemodialysis line was placed. She has been discharged to rehab facility although had to be readmitted secondary to lower extremity saphenous vein graft harvesting wound infection that required further debridement. She is done with antibiotics and still getting antibiotics intermittently. [MM]   2975 1619 EKG: This EKG is signed and interpreted by me.     Rate: 51  Rhythm: Sinus bradycardia  Interpretation: no acute changes, no acute ST elevations, left axis deviation  Comparison: stable as compared to patient's most recent EKG on 12/7/2022   [MM]   1328 On chart review from [facility], she is on Eliquis, aspirin, amiodarone, metoprolol, Bumex. The readmission was on 1/26. LVEF 38%. History of hypertension, hyperlipidemia, diabetes, chronic kidney disease, atrial fibrillation. Atrial, mitral, tricuspid valve replacement. [MM]   1340 She was last seen by her cardiologist in 2/28/2023 [MM]   1441 1440 EKG: This EKG is signed and interpreted by me. Rate: 53  Rhythm: Sinus  Interpretation: no acute changes  Comparison: stable as compared to patient's most recent EKG earlier today   [MM]   1442 Patient did have episodes worsening bradycardia into the 30s, reevaluated, blood pressure still stable, she is asymptomatic with no lightheadedness or dizziness or shortness of breath. [MM]   1507 Acute on chronic kidney injury [MM]   1518 Potassium(!): 5.8 [SO]   1522 Able to obtain rhythm strip for heart rate in the 30s and is still demonstrating to be sinus bradycardia. [MM]   7803 Discussed with Dr. Mirta Burkitt and he will accept for admission and management. [MM]   2480 Patient's PCP is arranging for outpatient management. Family is agreeable. [MM]      ED Course User Index  [MM] Gabi Mccullough DO  [SO] Desmond Aleman DO      CONSULTS: (Who and What was discussed)  IP CONSULT TO NEPHROLOGY  IP CONSULT TO INTERNAL MEDICINE  IP CONSULT TO CARDIOLOGY    FINAL IMPRESSION      1. Bradycardia    2.  Hyperkalemia          DISPOSITION/PLAN     DISPOSITION Decision To Discharge 03/15/2023 05:12:41 PM      PATIENT REFERRED TO:  MD Josiah Gonzalez Ezio Mayoa     Call   follow up    DISCHARGE MEDICATIONS:  Discharge Medication List as of 3/15/2023  5:40 PM               (Please note that portions of this note were completed with a voice recognition program.  Efforts were made to edit the dictations but occasionally words are mis-transcribed. )    Terrence Galvin DO (electronically signed)            Terrence Galvin DO  Resident  03/15/23 0028

## 2023-03-15 NOTE — ED NOTES
Pt stating she is not having chest pain or SOB. HR 34-51. EKG completed. Dr Markie Howell notified.  Pt ANO x 4     Juan Alberto Nicole RN  03/15/23 7445

## 2023-03-17 LAB
EKG ATRIAL RATE: 53 BPM
EKG Q-T INTERVAL: 500 MS
EKG QRS DURATION: 82 MS
EKG QTC CALCULATION (BAZETT): 469 MS
EKG R AXIS: 151 DEGREES
EKG T AXIS: 90 DEGREES
EKG VENTRICULAR RATE: 53 BPM

## 2023-03-18 LAB
BACTERIA UR CULT: ABNORMAL
BACTERIA UR CULT: ABNORMAL
ORGANISM: ABNORMAL

## 2023-05-26 LAB — POTASSIUM SERPL-SCNC: 3.9 MMOL/L (ref 3.5–5)

## 2024-03-20 RX ORDER — ONDANSETRON 2 MG/ML
4 INJECTION INTRAMUSCULAR; INTRAVENOUS EVERY 6 HOURS PRN
Status: CANCELLED | OUTPATIENT
Start: 2024-03-20

## 2024-03-20 RX ORDER — ATORVASTATIN CALCIUM 10 MG/1
10 TABLET, FILM COATED ORAL DAILY
COMMUNITY

## 2024-03-20 RX ORDER — ALLOPURINOL 100 MG/1
100 TABLET ORAL DAILY
COMMUNITY

## 2024-03-20 RX ORDER — AMIODARONE HYDROCHLORIDE 100 MG/1
100 TABLET ORAL EVERY MORNING
COMMUNITY

## 2024-03-20 RX ORDER — B,C/FOLIC/ZINC/SELENOMETH/D3/E 0.8-12.5MG
2 TABLET ORAL DAILY
COMMUNITY
End: 2024-03-20

## 2024-03-20 RX ORDER — ASPIRIN 81 MG/1
81 TABLET ORAL DAILY
COMMUNITY

## 2024-03-20 RX ORDER — B,C/FOLIC/ZINC/SELENOMETH/D3/E 0.8-12.5MG
1 TABLET ORAL 2 TIMES DAILY WITH MEALS
COMMUNITY

## 2024-03-20 RX ORDER — BRIMONIDINE TARTRATE 2 MG/ML
1 SOLUTION/ DROPS OPHTHALMIC 3 TIMES DAILY
Status: CANCELLED | OUTPATIENT
Start: 2024-03-20

## 2024-03-20 RX ORDER — ONDANSETRON 4 MG/1
4 TABLET, ORALLY DISINTEGRATING ORAL EVERY 8 HOURS PRN
Status: CANCELLED | OUTPATIENT
Start: 2024-03-20

## 2024-03-20 RX ORDER — VITAMIN B COMPLEX
1 CAPSULE ORAL DAILY
COMMUNITY

## 2024-03-20 RX ORDER — BUMETANIDE 1 MG/1
TABLET ORAL DAILY
COMMUNITY

## 2024-03-20 NOTE — PROGRESS NOTES
[facility] PRE-ADMISSION TESTING INSTRUCTIONS    The Preadmission Testing patient is instructed accordingly using the following criteria (check applicable):    ARRIVAL INSTRUCTIONS:  [x] Parking the day of Surgery is located in the Main Entrance lot.  Upon entering the door, make an immediate right to the surgery reception desk    [x] Bring photo ID and insurance card    [x] Bring in a copy of Living will or Durable Power of  papers.    [x] Please be sure to arrange for a responsible adult to provide transportation to and from the hospital    [x] Please arrange for a responsible adult to be with you for the 24 hour period post procedure due to having anesthesia    [x] If you awake am of surgery not feeling well or have temperature >100 please call [phone]    GENERAL INSTRUCTIONS:    [x] No solid foods after midnight, may have up to 8oz of water until 4 hours prior to surgery. No gum, no candy, no mints. NPO time: 0615       [x] You may brush your teeth, do not swallow any toothpaste    [x] Take medications as instructed     [x] Stop herbal supplements and vitamins prior to procedure    [] Follow preop dosing of blood thinners per physician instructions    [] Take 1/2 dose of evening insulin, but no insulin after midnight    [x] No oral diabetic medications after midnight    [x] If diabetic and have low blood sugar or feel symptomatic, take 1-2oz apple juice only    [] Bring inhalers day of surgery    [] Bring urine specimen day of surgery    [x] Shower or bath with soap, lather and rinse well, AM of Surgery, no lotion, powders or creams to surgical site    [] Follow bowel prep as instructed per surgeon    [x] No tobacco products within 24 hours of surgery     [x] No alcohol or illegal drug use, marijuana included, within 24 hours of surgery.    [x] Jewelry, body piercing's, eyeglasses, contact lenses and dentures are not permitted into surgery (bring cases)      [x] Please do

## 2024-03-20 NOTE — PROGRESS NOTES
Left voice message for Reena at Dr Donaldson's office requesting the medical clearance be faxed to PAT office.    Also, spoke to Chayo at Dr Black office - Chayo states she has the medical clearance and will fax the document to PAT office.

## 2024-03-21 ENCOUNTER — ANESTHESIA EVENT (OUTPATIENT)
Dept: OPERATING ROOM | Age: 84
End: 2024-03-21
Payer: MEDICARE

## 2024-03-21 ENCOUNTER — ANESTHESIA (OUTPATIENT)
Dept: OPERATING ROOM | Age: 84
End: 2024-03-21
Payer: MEDICARE

## 2024-03-21 ENCOUNTER — HOSPITAL ENCOUNTER (OUTPATIENT)
Age: 84
Setting detail: OUTPATIENT SURGERY
Discharge: HOME | End: 2024-03-21
Attending: STUDENT IN AN ORGANIZED HEALTH CARE EDUCATION/TRAINING PROGRAM | Admitting: STUDENT IN AN ORGANIZED HEALTH CARE EDUCATION/TRAINING PROGRAM
Payer: MEDICARE

## 2024-03-21 VITALS
BODY MASS INDEX: 27.46 KG/M2 | TEMPERATURE: 98 F | DIASTOLIC BLOOD PRESSURE: 55 MMHG | WEIGHT: 155 LBS | SYSTOLIC BLOOD PRESSURE: 123 MMHG | RESPIRATION RATE: 18 BRPM | OXYGEN SATURATION: 96 % | HEART RATE: 63 BPM | HEIGHT: 63 IN

## 2024-03-21 LAB
GLUCOSE BLD-MCNC: 128 MG/DL (ref 74–99)
POTASSIUM SERPL-SCNC: 4.3 MMOL/L (ref 3.5–5)

## 2024-03-21 PROCEDURE — 7100000011 HC PHASE II RECOVERY - ADDTL 15 MIN: Performed by: STUDENT IN AN ORGANIZED HEALTH CARE EDUCATION/TRAINING PROGRAM

## 2024-03-21 PROCEDURE — 3700000000 HC ANESTHESIA ATTENDED CARE: Performed by: STUDENT IN AN ORGANIZED HEALTH CARE EDUCATION/TRAINING PROGRAM

## 2024-03-21 PROCEDURE — 2500000003 HC RX 250 WO HCPCS: Performed by: STUDENT IN AN ORGANIZED HEALTH CARE EDUCATION/TRAINING PROGRAM

## 2024-03-21 PROCEDURE — 6360000002 HC RX W HCPCS: Performed by: NURSE ANESTHETIST, CERTIFIED REGISTERED

## 2024-03-21 PROCEDURE — 3600000012 HC SURGERY LEVEL 2 ADDTL 15MIN: Performed by: STUDENT IN AN ORGANIZED HEALTH CARE EDUCATION/TRAINING PROGRAM

## 2024-03-21 PROCEDURE — 6370000000 HC RX 637 (ALT 250 FOR IP): Performed by: STUDENT IN AN ORGANIZED HEALTH CARE EDUCATION/TRAINING PROGRAM

## 2024-03-21 PROCEDURE — 84132 ASSAY OF SERUM POTASSIUM: CPT

## 2024-03-21 PROCEDURE — 82962 GLUCOSE BLOOD TEST: CPT

## 2024-03-21 PROCEDURE — 3700000001 HC ADD 15 MINUTES (ANESTHESIA): Performed by: STUDENT IN AN ORGANIZED HEALTH CARE EDUCATION/TRAINING PROGRAM

## 2024-03-21 PROCEDURE — V2632 POST CHMBR INTRAOCULAR LENS: HCPCS | Performed by: STUDENT IN AN ORGANIZED HEALTH CARE EDUCATION/TRAINING PROGRAM

## 2024-03-21 PROCEDURE — 2580000003 HC RX 258: Performed by: NURSE ANESTHETIST, CERTIFIED REGISTERED

## 2024-03-21 PROCEDURE — 3600000002 HC SURGERY LEVEL 2 BASE: Performed by: STUDENT IN AN ORGANIZED HEALTH CARE EDUCATION/TRAINING PROGRAM

## 2024-03-21 PROCEDURE — 6360000002 HC RX W HCPCS: Performed by: STUDENT IN AN ORGANIZED HEALTH CARE EDUCATION/TRAINING PROGRAM

## 2024-03-21 PROCEDURE — 7100000010 HC PHASE II RECOVERY - FIRST 15 MIN: Performed by: STUDENT IN AN ORGANIZED HEALTH CARE EDUCATION/TRAINING PROGRAM

## 2024-03-21 PROCEDURE — 2709999900 HC NON-CHARGEABLE SUPPLY: Performed by: STUDENT IN AN ORGANIZED HEALTH CARE EDUCATION/TRAINING PROGRAM

## 2024-03-21 PROCEDURE — 6370000000 HC RX 637 (ALT 250 FOR IP)

## 2024-03-21 DEVICE — STERILE UV AND BLUE LIGHT FILTERING ACRYLIC FOLDABLE ASPHERIC POSTERIOR CHAMBER INTRAOCULAR LENS
Type: IMPLANTABLE DEVICE | Site: EYE | Status: FUNCTIONAL
Brand: CLAREON

## 2024-03-21 RX ORDER — MIDAZOLAM HYDROCHLORIDE 1 MG/ML
INJECTION INTRAMUSCULAR; INTRAVENOUS PRN
Status: DISCONTINUED | OUTPATIENT
Start: 2024-03-21 | End: 2024-03-21 | Stop reason: SDUPTHER

## 2024-03-21 RX ORDER — FENTANYL CITRATE 50 UG/ML
INJECTION, SOLUTION INTRAMUSCULAR; INTRAVENOUS PRN
Status: DISCONTINUED | OUTPATIENT
Start: 2024-03-21 | End: 2024-03-21 | Stop reason: SDUPTHER

## 2024-03-21 RX ORDER — KETOROLAC TROMETHAMINE 5 MG/ML
1 SOLUTION OPHTHALMIC
Status: COMPLETED | OUTPATIENT
Start: 2024-03-21 | End: 2024-03-21

## 2024-03-21 RX ORDER — TROPICAMIDE 10 MG/ML
1 SOLUTION/ DROPS OPHTHALMIC
Status: COMPLETED | OUTPATIENT
Start: 2024-03-21 | End: 2024-03-21

## 2024-03-21 RX ORDER — PHENYLEPHRINE HYDROCHLORIDE 25 MG/ML
1 SOLUTION/ DROPS OPHTHALMIC
Status: COMPLETED | OUTPATIENT
Start: 2024-03-21 | End: 2024-03-21

## 2024-03-21 RX ORDER — CYCLOPENTOLATE HYDROCHLORIDE 10 MG/ML
1 SOLUTION/ DROPS OPHTHALMIC
Status: COMPLETED | OUTPATIENT
Start: 2024-03-21 | End: 2024-03-21

## 2024-03-21 RX ORDER — CIPROFLOXACIN HYDROCHLORIDE 3.5 MG/ML
1 SOLUTION/ DROPS TOPICAL SEE ADMIN INSTRUCTIONS
Status: COMPLETED | OUTPATIENT
Start: 2024-03-21 | End: 2024-03-21

## 2024-03-21 RX ORDER — PREDNISOLONE ACETATE 10 MG/ML
SUSPENSION/ DROPS OPHTHALMIC PRN
Status: DISCONTINUED | OUTPATIENT
Start: 2024-03-21 | End: 2024-03-21 | Stop reason: HOSPADM

## 2024-03-21 RX ORDER — SODIUM CHLORIDE 9 MG/ML
INJECTION, SOLUTION INTRAVENOUS CONTINUOUS PRN
Status: DISCONTINUED | OUTPATIENT
Start: 2024-03-21 | End: 2024-03-21 | Stop reason: SDUPTHER

## 2024-03-21 RX ORDER — CIPROFLOXACIN HYDROCHLORIDE 3.5 MG/ML
SOLUTION/ DROPS TOPICAL PRN
Status: DISCONTINUED | OUTPATIENT
Start: 2024-03-21 | End: 2024-03-21 | Stop reason: HOSPADM

## 2024-03-21 RX ORDER — PROPARACAINE HYDROCHLORIDE 5 MG/ML
1 SOLUTION/ DROPS OPHTHALMIC SEE ADMIN INSTRUCTIONS
Status: DISCONTINUED | OUTPATIENT
Start: 2024-03-21 | End: 2024-03-21 | Stop reason: HOSPADM

## 2024-03-21 RX ORDER — SODIUM CHLORIDE, SODIUM LACTATE, POTASSIUM CHLORIDE, CALCIUM CHLORIDE 600; 310; 30; 20 MG/100ML; MG/100ML; MG/100ML; MG/100ML
INJECTION, SOLUTION INTRAVENOUS CONTINUOUS
Status: DISCONTINUED | OUTPATIENT
Start: 2024-03-21 | End: 2024-03-21 | Stop reason: HOSPADM

## 2024-03-21 RX ORDER — BRIMONIDINE TARTRATE 2 MG/ML
SOLUTION/ DROPS OPHTHALMIC PRN
Status: DISCONTINUED | OUTPATIENT
Start: 2024-03-21 | End: 2024-03-21 | Stop reason: HOSPADM

## 2024-03-21 RX ORDER — CEFAZOLIN SODIUM 1 G/3ML
INJECTION, POWDER, FOR SOLUTION INTRAMUSCULAR; INTRAVENOUS PRN
Status: DISCONTINUED | OUTPATIENT
Start: 2024-03-21 | End: 2024-03-21 | Stop reason: HOSPADM

## 2024-03-21 RX ADMIN — KETOROLAC TROMETHAMINE 1 DROP: 5 SOLUTION OPHTHALMIC at 07:24

## 2024-03-21 RX ADMIN — Medication 1 DROP: at 07:14

## 2024-03-21 RX ADMIN — PHENYLEPHRINE HYDROCHLORIDE 1 DROP: 25 SOLUTION/ DROPS OPHTHALMIC at 06:53

## 2024-03-21 RX ADMIN — PHENYLEPHRINE HYDROCHLORIDE 1 DROP: 25 SOLUTION/ DROPS OPHTHALMIC at 07:14

## 2024-03-21 RX ADMIN — Medication 1 DROP: at 07:24

## 2024-03-21 RX ADMIN — CIPROFLOXACIN 1 DROP: 3 SOLUTION OPHTHALMIC at 07:24

## 2024-03-21 RX ADMIN — KETOROLAC TROMETHAMINE 1 DROP: 5 SOLUTION OPHTHALMIC at 07:14

## 2024-03-21 RX ADMIN — MIDAZOLAM 0.5 MG: 1 INJECTION INTRAMUSCULAR; INTRAVENOUS at 08:12

## 2024-03-21 RX ADMIN — MIDAZOLAM 0.5 MG: 1 INJECTION INTRAMUSCULAR; INTRAVENOUS at 08:02

## 2024-03-21 RX ADMIN — PHENYLEPHRINE HYDROCHLORIDE 1 DROP: 25 SOLUTION/ DROPS OPHTHALMIC at 07:03

## 2024-03-21 RX ADMIN — Medication 1 DROP: at 06:53

## 2024-03-21 RX ADMIN — PHENYLEPHRINE HYDROCHLORIDE 1 DROP: 25 SOLUTION/ DROPS OPHTHALMIC at 07:24

## 2024-03-21 RX ADMIN — CYCLOPENTOLATE HYDROCHLORIDE 1 DROP: 10 SOLUTION/ DROPS OPHTHALMIC at 07:24

## 2024-03-21 RX ADMIN — CIPROFLOXACIN 1 DROP: 3 SOLUTION OPHTHALMIC at 07:03

## 2024-03-21 RX ADMIN — Medication 1 DROP: at 07:03

## 2024-03-21 RX ADMIN — FENTANYL CITRATE 25 MCG: 50 INJECTION, SOLUTION INTRAMUSCULAR; INTRAVENOUS at 08:13

## 2024-03-21 RX ADMIN — CYCLOPENTOLATE HYDROCHLORIDE 1 DROP: 10 SOLUTION/ DROPS OPHTHALMIC at 07:03

## 2024-03-21 RX ADMIN — SODIUM CHLORIDE: 9 INJECTION, SOLUTION INTRAVENOUS at 07:55

## 2024-03-21 RX ADMIN — KETOROLAC TROMETHAMINE 1 DROP: 5 SOLUTION OPHTHALMIC at 06:53

## 2024-03-21 RX ADMIN — CYCLOPENTOLATE HYDROCHLORIDE 1 DROP: 10 SOLUTION/ DROPS OPHTHALMIC at 06:53

## 2024-03-21 RX ADMIN — CYCLOPENTOLATE HYDROCHLORIDE 1 DROP: 10 SOLUTION/ DROPS OPHTHALMIC at 07:14

## 2024-03-21 RX ADMIN — FENTANYL CITRATE 25 MCG: 50 INJECTION, SOLUTION INTRAMUSCULAR; INTRAVENOUS at 08:03

## 2024-03-21 RX ADMIN — KETOROLAC TROMETHAMINE 1 DROP: 5 SOLUTION OPHTHALMIC at 07:03

## 2024-03-21 RX ADMIN — CIPROFLOXACIN 1 DROP: 3 SOLUTION OPHTHALMIC at 06:53

## 2024-03-21 RX ADMIN — CIPROFLOXACIN 1 DROP: 3 SOLUTION OPHTHALMIC at 07:14

## 2024-03-21 ASSESSMENT — PAIN - FUNCTIONAL ASSESSMENT: PAIN_FUNCTIONAL_ASSESSMENT: 0-10

## 2024-03-21 ASSESSMENT — LIFESTYLE VARIABLES: SMOKING_STATUS: 0

## 2024-03-21 NOTE — OP NOTE
placed in the eye and the nucleus was removed through a combination of techniques. After all the nuclear fragments were moved, the irrigation-aspiration cannula was used to aspirate the residual cortical matter.  Viscoelastic was then used to inflate the capsular bag.  The intraocular lens was placed un the injector.  The leading haptic and optic were injected into the capsular bag.  The trailing haptic was rotated into the capsular bag with the Sinsky hook.       The irrigation and aspiration divide was used to remove any remaining viscoelastic. A 27  gauge canula was used to hydrate the paracentesis and main wound to ensure watertight closure.  This was verified using Weck-luther sponges. Subconjunctival Ancef was injected.     The lid speculum and drapes were removed.  Topical Ciprofloxacin, Brimonidine and Prednisolone were placed in the operative eye. A shield was placed. The patient was taken to the recovery room in stable condition.      Stable condition on discharge     Electronically signed by Angela Black MD on 3/21/2024 at 8:37 AM

## 2024-03-21 NOTE — ANESTHESIA POSTPROCEDURE EVALUATION
Department of Anesthesiology  Postprocedure Note    Patient: Missy Gutierrez  MRN: [id number]  YOB: 1940  Date of evaluation: 3/21/2024    Procedure Summary       Date: 03/21/24 Room / Location: [address]    Anesthesia Start: 0755 Anesthesia Stop: 0840    Procedure: LEFT EYE CATARACT EXTRACTION IOL IMPLANT (Left: Eye) Diagnosis:       Combined forms of age-related cataract of left eye      (Combined forms of age-related cataract of left eye [H25.812])    Surgeons: Angela Black MD Responsible Provider: Karyna Souza DO    Anesthesia Type: MAC ASA Status: 4            Anesthesia Type: No value filed.    Kimberley Phase I: Kimberley Score: 10    Kimberley Phase II:      Anesthesia Post Evaluation    Patient location during evaluation: PACU (phase 2)  Level of consciousness: awake and alert  Airway patency: patent  Nausea & Vomiting: no vomiting and no nausea  Cardiovascular status: hemodynamically stable  Respiratory status: spontaneous ventilation and room air  Hydration status: stable  Pain management: adequate        No notable events documented.

## 2024-03-21 NOTE — DISCHARGE INSTRUCTIONS
1.  PLEASE READ AFTER CATARACT INSTRUCTIONS FROM DR. DUVAL.    2.  LEAVE THE EYE PATCH ON TILL TOMORROW .  DR. DUVAL WILL REMOVE THE PATCH IN THE MORNING.    3.  FOLLOW UP IN DR DUVAL OFFICE TOMORROW AT 8:45 AM.  SHE WILL GIVE YOU ALL YOUR EYE DROPS AND INSTRUCTIONS AT THAT TIME.     4.  FOLLOW POST ANESTHESIA DISCHARGE INSTRUCTIONS PROVIDED.         Angela Duval M.D.  Diplomate [employer]    [address] [place] [address]  Phone: [phone]  Fax: [phone]  After Cataract Surgery Patient Directions       A recently operated eye must be protected from injury and infection since it is healing.                                           You must always scrub your hands well before applying any medications, treating or touching about the eye; this will lessen the chance of introducing infection.    Some REDNESS is expected, as you will note when the dressing is removed; this redness should gradually lessen.    Mild irritation is expected.  It may feel like an eyelash irritating the eye.  DEEP ACHING pain is NOT expected.  Please call me immediately if you have any problems at office or through medical dental [phone]    A mucus discharge will sometimes be found on the lashes or at the corners of the eye; this is normal in modest amounts. Use a clean tissue directly from the box (not a purse or pocket) or a clean washcloth to gently wipe around the eye to remove any mucus from the eye or lashes.     If this discharge should become excessive and yellow (pus-like), notify me immediately.    If any eye, eyelid or orbital swelling becomes prominent notify me.    Your vision may be blurred for a period of time postoperatively while the eye is healing.  The vision should not dramatically worsen at any time.  Notify me if there is any significant change.    If any other eye changes occur that seem out of the ordinary please contact our office.    You should not

## 2024-03-21 NOTE — ANESTHESIA PRE PROCEDURE
Department of Anesthesiology  Preprocedure Note       Name:  Missy Gutierrez   Age:  84 y.o.  :  1940                                          MRN:  [id number]         Date:  3/21/2024      Surgeon: Surgeon(s):  Angela Black MD    Procedure: Procedure(s):  LEFT EYE CATARACT EXTRACTION IOL IMPLANT    Medications prior to admission:   Prior to Admission medications    Medication Sig Start Date End Date Taking? Authorizing Provider   allopurinol (ZYLOPRIM) 100 MG tablet Take 1 tablet by mouth daily   Yes Rosa Monae MD   aspirin 81 MG EC tablet Take 1 tablet by mouth daily   Yes Rosa Monae MD   atorvastatin (LIPITOR) 10 MG tablet Take 1 tablet by mouth daily   Yes Rosa Monae MD   MAGNESIUM PO Take 400 mg by mouth daily   Yes Rosa Monae MD   b complex vitamins capsule Take 1 capsule by mouth daily   Yes Rosa Monae MD   bumetanide (BUMEX) 1 MG tablet Take by mouth daily   Yes Rosa Monae MD   amiodarone (PACERONE) 100 MG tablet Take 1 tablet by mouth every morning   Yes Rosa Monae MD   SITagliptin (JANUVIA) 50 MG tablet Take 12.5 mg by mouth daily   Yes ProviderRosa MD   Multiple Vitamins-Minerals (RENAPLEX-D) TABS Take 1 tablet by mouth 2 times daily (with meals)   Yes ProviderRosa MD       Current medications:    Current Facility-Administered Medications   Medication Dose Route Frequency Provider Last Rate Last Admin    tropicamide (MYDRIACYL) 1 % ophthalmic solution 1 drop  1 drop Left Eye Q15 Min PRN Angela Black MD        phenylephrine (MYDFRIN) 2.5 % ophthalmic solution 1 drop  1 drop Left Eye Q15 Min PRN Angela Black MD        cyclopentolate (CYCLOGYL) 1 % ophthalmic solution 1 drop  1 drop Left Eye Q15 Min PRN Angela Black MD        ciprofloxacin (CILOXAN) 0.3 % ophthalmic solution 1 drop  1 drop Left Eye See Admin Instructions Angela Black MD        ketorolac (ACULAR)

## 2024-05-01 NOTE — ED NOTES
CC called, pt assigned to bed J-51 bed 16  Call nurse to nurse at [address] [place] [address]  12/07/22 5589 Yes

## (undated) DEVICE — NEEDLE HYPO 25GA L0.625IN BLU POLYPR HUB S STL REG BVL STR

## (undated) DEVICE — SHIELD EYE W3XL2.5IN UNIV CLR PLAS LTWT

## (undated) DEVICE — HANDPIECE IRRIG 45DEG ASPIR SIL CAP GRD

## (undated) DEVICE — THE MONARCH® "D" CARTRIDGE IS A SINGLE-USE POLYPROPYLENE CARTRIDGE FOR POSTERIOR CHAMBER IOL DELIVERY: Brand: MONARCH® III

## (undated) DEVICE — PACK PROC FLD MGMT SYS CENTURION CUST

## (undated) DEVICE — 1 ML TUBERCULIN SYRINGE,DETACHABLE NEEDLE: Brand: MONOJECT

## (undated) DEVICE — CANNULA OPHTH 25GA 7 8IN ORNG 45DEG ANG 4MM FR END DOME SHP

## (undated) DEVICE — NEEDLE HYPO 30GA L0.5IN BGE POLYPR HUB S STL REG BVL STR

## (undated) DEVICE — PAD PREP L 2 PLY 70% ISO ALC NONWOVEN SFT ABSRB TOP ANTISEP

## (undated) DEVICE — CLEARCUT® SLIT KNIFE INTREPID MICRO-COAXIAL SYSTEM 2.4 SB: Brand: CLEARCUT®; INTREPID

## (undated) DEVICE — CANNULA HYDRDISECT L0.75IN OD27GA DISP CHNG

## (undated) DEVICE — 40436 HEAD REST OCULAR: Brand: 40436 HEAD REST OCULAR

## (undated) DEVICE — SCALPEL 15 DEG NO 715

## (undated) DEVICE — SYRINGE MEDICAL 3ML CLEAR PLASTIC STANDARD NON CONTROL LUERLOCK TIP DISPOSABLE

## (undated) DEVICE — SOLUTION IRRIGATION BAL SALT SOLUTION 15 ML STRL BSS

## (undated) DEVICE — GLASSES SAFETY PROTCT GRN

## (undated) DEVICE — COVER MICROSCOPE KNOB LG

## (undated) DEVICE — NEEDLE FLTR 18GA L1.5IN MEM THK5UM BLNT DISP

## (undated) DEVICE — GLOVE SURG SZ 65 CRM LTX FREE POLYISOPRENE POLYMER BEAD ANTI

## (undated) DEVICE — PACK PROCEDURE SURG SURG CATARACT CUSTOM